# Patient Record
Sex: FEMALE | Race: WHITE | NOT HISPANIC OR LATINO
[De-identification: names, ages, dates, MRNs, and addresses within clinical notes are randomized per-mention and may not be internally consistent; named-entity substitution may affect disease eponyms.]

---

## 2019-11-14 ENCOUNTER — APPOINTMENT (OUTPATIENT)
Dept: OTOLARYNGOLOGY | Facility: CLINIC | Age: 49
End: 2019-11-14
Payer: COMMERCIAL

## 2019-11-14 ENCOUNTER — APPOINTMENT (OUTPATIENT)
Dept: OTOLARYNGOLOGY | Facility: CLINIC | Age: 49
End: 2019-11-14
Payer: SELF-PAY

## 2019-11-14 VITALS — HEIGHT: 67 IN | BODY MASS INDEX: 22.6 KG/M2 | WEIGHT: 144 LBS

## 2019-11-14 VITALS
TEMPERATURE: 98.4 F | DIASTOLIC BLOOD PRESSURE: 74 MMHG | HEART RATE: 75 BPM | SYSTOLIC BLOOD PRESSURE: 113 MMHG | OXYGEN SATURATION: 100 %

## 2019-11-14 DIAGNOSIS — Z82.3 FAMILY HISTORY OF STROKE: ICD-10-CM

## 2019-11-14 DIAGNOSIS — Z82.49 FAMILY HISTORY OF ISCHEMIC HEART DISEASE AND OTHER DISEASES OF THE CIRCULATORY SYSTEM: ICD-10-CM

## 2019-11-14 DIAGNOSIS — H91.90 UNSPECIFIED HEARING LOSS, UNSPECIFIED EAR: ICD-10-CM

## 2019-11-14 DIAGNOSIS — H81.392 OTHER PERIPHERAL VERTIGO, LEFT EAR: ICD-10-CM

## 2019-11-14 DIAGNOSIS — Z72.89 OTHER PROBLEMS RELATED TO LIFESTYLE: ICD-10-CM

## 2019-11-14 DIAGNOSIS — H93.8X9 OTHER SPECIFIED DISORDERS OF EAR, UNSPECIFIED EAR: ICD-10-CM

## 2019-11-14 DIAGNOSIS — H91.22 SUDDEN IDIOPATHIC HEARING LOSS, LEFT EAR: ICD-10-CM

## 2019-11-14 DIAGNOSIS — Z85.3 PERSONAL HISTORY OF MALIGNANT NEOPLASM OF BREAST: ICD-10-CM

## 2019-11-14 DIAGNOSIS — Z78.9 OTHER SPECIFIED HEALTH STATUS: ICD-10-CM

## 2019-11-14 PROBLEM — Z00.00 ENCOUNTER FOR PREVENTIVE HEALTH EXAMINATION: Status: ACTIVE | Noted: 2019-11-14

## 2019-11-14 PROCEDURE — 99244 OFF/OP CNSLTJ NEW/EST MOD 40: CPT

## 2019-11-14 PROCEDURE — 92700 UNLISTED ORL SERVICE/PX: CPT

## 2019-11-14 PROCEDURE — 92557 COMPREHENSIVE HEARING TEST: CPT

## 2019-11-14 PROCEDURE — 92550 TYMPANOMETRY & REFLEX THRESH: CPT

## 2019-11-14 RX ORDER — DOXYCLYCLINE HYCLATE 150 MG/1
TABLET, COATED ORAL
Refills: 0 | Status: ACTIVE | COMMUNITY

## 2019-11-14 RX ORDER — DOXYCYCLINE 100 MG/1
100 TABLET, FILM COATED ORAL
Qty: 14 | Refills: 0 | Status: ACTIVE | COMMUNITY
Start: 2019-11-11

## 2019-11-14 RX ORDER — OMEPRAZOLE 40 MG/1
40 CAPSULE, DELAYED RELEASE ORAL
Qty: 14 | Refills: 0 | Status: ACTIVE | COMMUNITY
Start: 2019-11-14 | End: 1900-01-01

## 2019-11-14 RX ORDER — CHROMIUM 200 MCG
TABLET ORAL
Refills: 0 | Status: ACTIVE | COMMUNITY

## 2019-11-14 RX ORDER — IBUPROFEN 800 MG
TABLET ORAL
Refills: 0 | Status: ACTIVE | COMMUNITY

## 2019-11-14 RX ORDER — TRIAMCINOLONE ACETONIDE 55 MCG
AEROSOL WITH ADAPTER (GRAM) NASAL
Refills: 0 | Status: ACTIVE | COMMUNITY

## 2019-11-14 RX ORDER — DIAZEPAM 5 MG/1
5 TABLET ORAL
Qty: 2 | Refills: 0 | Status: ACTIVE | COMMUNITY
Start: 2019-11-14 | End: 1900-01-01

## 2019-11-14 RX ORDER — PREDNISONE 10 MG/1
10 TABLET ORAL
Qty: 45 | Refills: 0 | Status: ACTIVE | COMMUNITY
Start: 2019-11-14 | End: 1900-01-01

## 2019-11-14 NOTE — HISTORY OF PRESENT ILLNESS
[de-identified] : 50 yo woman with 5 week h/o l aural fullness, distortion of sounds, hl and dizziness . It is very bothersome. she has been treated for fluid in the ear but is about to leave town so she wished to be evaluated by an otologist. She has not had this before but her  did in the past. She has "brain fog" nonsmoker. h/o dcis. no fh relevant to cc.sx are severe.

## 2019-11-20 ENCOUNTER — APPOINTMENT (OUTPATIENT)
Dept: OTOLARYNGOLOGY | Facility: CLINIC | Age: 49
End: 2019-11-20

## 2019-11-21 ENCOUNTER — APPOINTMENT (OUTPATIENT)
Dept: OTOLARYNGOLOGY | Facility: CLINIC | Age: 49
End: 2019-11-21

## 2021-09-01 ENCOUNTER — APPOINTMENT (OUTPATIENT)
Dept: OTOLARYNGOLOGY | Facility: CLINIC | Age: 51
End: 2021-09-01
Payer: COMMERCIAL

## 2021-09-01 VITALS
SYSTOLIC BLOOD PRESSURE: 119 MMHG | WEIGHT: 148 LBS | BODY MASS INDEX: 23.23 KG/M2 | HEART RATE: 77 BPM | OXYGEN SATURATION: 95 % | TEMPERATURE: 98.2 F | DIASTOLIC BLOOD PRESSURE: 76 MMHG | HEIGHT: 67 IN

## 2021-09-01 DIAGNOSIS — H93.12 TINNITUS, LEFT EAR: ICD-10-CM

## 2021-09-01 DIAGNOSIS — R42 DIZZINESS AND GIDDINESS: ICD-10-CM

## 2021-09-01 DIAGNOSIS — H90.42 SENSORINEURAL HEARING LOSS, UNILATERAL, LEFT EAR, WITH UNRESTRICTED HEARING ON THE CONTRALATERAL SIDE: ICD-10-CM

## 2021-09-01 PROCEDURE — 92557 COMPREHENSIVE HEARING TEST: CPT

## 2021-09-01 PROCEDURE — 99214 OFFICE O/P EST MOD 30 MIN: CPT

## 2021-09-01 PROCEDURE — 92550 TYMPANOMETRY & REFLEX THRESH: CPT

## 2021-09-01 NOTE — HISTORY OF PRESENT ILLNESS
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 2019 - 3 weeks ago in [place] sx got worse and went to [facility] md - thought ministrokes - refused ambulance - got a brain mri tues before last and told dura inflamed again. When this happens she gets l aural fullness and disorientation and dizziness. Has appt with Dr Beaver scheduled for next week. She said Dr Gallardo wants to know my input. She is sure her l hearing is worse

## 2021-09-01 NOTE — REASON FOR VISIT
[Subsequent Evaluation] : a subsequent evaluation for [FreeTextEntry2] : l aural fullness and tinnitus.

## 2021-09-01 NOTE — ASSESSMENT
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

## 2021-10-04 ENCOUNTER — APPOINTMENT (OUTPATIENT)
Dept: OTOLARYNGOLOGY | Facility: CLINIC | Age: 51
End: 2021-10-04